# Patient Record
Sex: FEMALE | Race: WHITE | URBAN - METROPOLITAN AREA
[De-identification: names, ages, dates, MRNs, and addresses within clinical notes are randomized per-mention and may not be internally consistent; named-entity substitution may affect disease eponyms.]

---

## 2017-09-12 ENCOUNTER — TELEPHONE (OUTPATIENT)
Dept: FAMILY MEDICINE CLINIC | Age: 27
End: 2017-09-12

## 2018-02-15 ENCOUNTER — TELEPHONE (OUTPATIENT)
Dept: FAMILY MEDICINE CLINIC | Age: 28
End: 2018-02-15

## 2018-02-15 NOTE — TELEPHONE ENCOUNTER
please call harriet about mrs brooks. Please tell her that I am sorry, but with dr blanco recently leaving, I am swamped right now and not taking new patients. sorry.

## 2018-02-15 NOTE — TELEPHONE ENCOUNTER
Pt & her mother Iglesia Silvestre( former pt moved to [facility])  Are calling to see if you would see [facility] IN [place], [facility] mom as a New Pt. Mom name is Martin Smith [phone].   Please advise,  Iglesia Silvestre  [phone]